# Patient Record
Sex: MALE | Race: WHITE
[De-identification: names, ages, dates, MRNs, and addresses within clinical notes are randomized per-mention and may not be internally consistent; named-entity substitution may affect disease eponyms.]

---

## 2017-09-17 ENCOUNTER — HOSPITAL ENCOUNTER (EMERGENCY)
Dept: HOSPITAL 80 - FED | Age: 37
Discharge: HOME | End: 2017-09-17
Payer: MEDICAID

## 2017-09-17 VITALS — TEMPERATURE: 98.2 F

## 2017-09-17 VITALS
HEART RATE: 78 BPM | DIASTOLIC BLOOD PRESSURE: 82 MMHG | RESPIRATION RATE: 18 BRPM | SYSTOLIC BLOOD PRESSURE: 134 MMHG | OXYGEN SATURATION: 94 %

## 2017-09-17 DIAGNOSIS — F17.200: ICD-10-CM

## 2017-09-17 DIAGNOSIS — R07.9: Primary | ICD-10-CM

## 2017-09-17 LAB
% IMMATURE GRANULYOCYTES: 0.1 % (ref 0–1.1)
ABSOLUTE IMMATURE GRANULOCYTES: 0.01 10^3/UL (ref 0–0.1)
ABSOLUTE NRBC COUNT: 0 10^3/UL (ref 0–0.01)
ADD DIFF?: NO
ADD MORPH?: NO
ADD SCAN?: NO
ANION GAP SERPL CALC-SCNC: 11 MEQ/L (ref 8–16)
ATYPICAL LYMPHOCYTE FLAG: 10 (ref 0–99)
CALCIUM SERPL-MCNC: 9.9 MG/DL (ref 8.5–10.4)
CHLORIDE SERPL-SCNC: 101 MEQ/L (ref 97–110)
CO2 SERPL-SCNC: 23 MEQ/L (ref 22–31)
CREAT SERPL-MCNC: 0.9 MG/DL (ref 0.7–1.3)
ERYTHROCYTE [DISTWIDTH] IN BLOOD BY AUTOMATED COUNT: 11.6 % (ref 11.5–15.2)
FRAGMENT RBC FLAG: 0 (ref 0–99)
GFR SERPL CREATININE-BSD FRML MDRD: > 60 ML/MIN/{1.73_M2}
GLUCOSE SERPL-MCNC: 112 MG/DL (ref 70–100)
HCT VFR BLD CALC: 43.2 % (ref 40–51)
HGB BLD-MCNC: 15.7 G/DL (ref 13.7–17.5)
LEFT SHIFT FLG: 0 (ref 0–99)
LIPEMIA HEMOLYSIS FLAG: 90 (ref 0–99)
MCH RBC BLDCO QN: 32 PG (ref 27.9–34.1)
MCHC RBC AUTO-ENTMCNC: 36.3 G/DL (ref 32.4–36.7)
MCV RBC AUTO: 88.2 FL (ref 81.5–99.8)
NRBC-AUTO%: 0 % (ref 0–0.2)
PLATELET # BLD: 219 10^3/UL (ref 150–400)
PLATELET CLUMPS FLAG: 0 (ref 0–99)
PMV BLD AUTO: 9.3 FL (ref 8.7–11.7)
POTASSIUM SERPL-SCNC: 4.4 MEQ/L (ref 3.5–5.2)
RBC # BLD AUTO: 4.9 10^6/UL (ref 4.4–6.38)
SODIUM SERPL-SCNC: 135 MEQ/L (ref 134–144)
TROPONIN I SERPL-MCNC: < 0.012 NG/ML (ref 0–0.03)

## 2017-09-17 NOTE — CPEKG
Heart Rate: 79

RR Interval: 759

P-R Interval: 168

QRSD Interval: 92

QT Interval: 380

QTC Interval: 436

P Axis: 69

QRS Axis: 83

T Wave Axis: 65

EKG Severity - BORDERLINE ECG -

EKG Impression: SINUS RHYTHM

EKG Impression: BORDERLINE T ABNORMALITIES, ANT-LAT LEADS

Electronically Signed By: Rosmery Eli 17-Sep-2017 15:50:33

## 2017-09-17 NOTE — EDPHY
H & P


Stated Complaint: Intermit L lat CP;sxs sporadic "for a while";worse x 1 day





- Personal History


Current Tetanus Diphtheria and Acellular Pertussis (TDAP): Yes





- Medical/Surgical History


Hx Asthma: No


Hx Chronic Respiratory Disease: No


Hx Diabetes: No


Hx Cardiac Disease: No


Hx Renal Disease: No


Hx Cirrhosis: No


Hx Alcoholism: No


Hx HIV/AIDS: No


Hx Splenectomy or Spleen Trauma: No


Other PMH: PTSD/bilateral inguinal hernia, ear sx, anxiety/CHOLY/APPY.  R thumb 

surg





- Social History


Smoking Status: Current every day smoker


Time Seen by Provider: 09/17/17 12:19


HPI/ROS: 





Chief complaint:  Chest pain





History of present illness:  This is a 37-year-old male who presents to the 

emergency department for chest pain.  Patient reports the onset of symptoms 

over the last day.  States pain is primarily on the left side.  He reports a 

soreness on the left side.  It occasionally radiates into the left arm.  He 

denies precipitating factors.  He denies alleviating factors.  He denies other 

associated signs or symptoms including no fevers, no cold symptoms, no cough or 

trouble breathing, no pain or swelling in the legs.  He has a long history of 

anxiety and has been on Xanax for a long time but has been slowly decreasing 

the dose and is wondering if anxiety could be contributing to his symptoms.





Review of systems:  A 10 point review of systems was obtained and other than 

described above was negative (Jonh Banerjee)





- Physical Exam


Exam: 





General Appearance: Alert, nontoxic.


Eyes: Pupils equal and round no pallor or injection.


ENT, Mouth: Mucous membranes moist.


Respiratory: There are no retractions, lungs are clear to auscultation.


Cardiovascular:  Regular rate and rhythm.


Gastrointestinal:  Abdomen is soft and non tender, no masses, bowel sounds 

normal.


Neurological:  Alert and oriented x4.  Strength and sensation intact and 

symmetrical.


Skin: Warm and dry, no rashes.


Musculoskeletal: Neck is supple non tender. Extremities are symmetrical, full 

range of motion.  No erythema, edema or asymmetry of the lower extremities.  No 

evidence of DVT.


Psychiatric: Patient is oriented X 3, there is no agitation.


 (John Banerjee)


Constitutional: 





 Initial Vital Signs











Temperature (C)  36.8 C   09/17/17 11:55


 


Heart Rate  90   09/17/17 11:55


 


Respiratory Rate  18   09/17/17 11:55


 


Blood Pressure  122/77 H  09/17/17 11:55


 


O2 Sat (%)  97   09/17/17 11:55








 











O2 Delivery Mode               Room Air














Allergies/Adverse Reactions: 


 





acetaminophen [From Vicodin] Allergy (Verified 09/17/17 11:59)


 


amoxicillin Allergy (Verified 09/17/17 11:59)


 


hydrocodone bitartrate [From Vicodin] Allergy (Verified 09/17/17 11:59)


 


Penicillins Allergy (Verified 09/17/17 11:59)


 


tramadol Allergy (Verified 09/17/17 11:59)


 








Home Medications: 














 Medication  Instructions  Recorded


 


ALPRAZolam [Xanax 0.25 MG (*)]  09/29/15














Medical Decision Making


ED Course/Re-evaluation: 





Patient is discussed with my secondary supervising physician Dr. Rosmery Eli.  

Patient presents to the emergency department for chest pain.  He is nontoxic.  

Afebrile and vital signs are stable.  Physical exam is unremarkable.  Lab 

studies, chest x-ray and EKG unremarkable.  My suspicion for serious underlying 

pathology is low.  I believe he is appropriate for outpatient follow-up for 

further evaluation and care.  He is referred to primary care.  Home care is 

discussed.  Return precautions are given.  Patient voiced understanding and 

agreement with plan. (John Banerjee)


Differential Diagnosis: 





Included but not limited to anxiety, musculoskeletal pain, reflux, GERD, 

cardiac dysrhythmia, PE, pulmonary infections, pneumothorax, ACS (John Banerjee)


Other Provider: 





The patient was evaluated and managed by the Physician Assistant/ Nurse 

Practitioner.  I discussed the patient's presentation and course with the 

midlevel provider with them and agree with the evaluation.  My co-signature 

indicates that I have reviewed this chart and I agree with the findings and 

plan of care as documented.  I am the secondary supervising physician. (Rosmery Eli)





- Data Points


Laboratory Results: 





 Laboratory Results





 09/17/17 12:40 





 09/17/17 12:40 











Departure





- Departure


Disposition: Home, Routine, Self-Care


Clinical Impression: 


 Chest pain





Condition: Good


Instructions:  Chest Pain (ED)


Additional Instructions: 


Follow-up with a primary care doctor for continued evaluation and care





If symptoms worsen or new symptoms develop return to the emergency room for 

recheck


Referrals: 


NONE *PRIMARY CARE P,. [Primary Care Provider] - As per Instructions


[facility] - As per Instructions

## 2018-09-03 ENCOUNTER — HOSPITAL ENCOUNTER (EMERGENCY)
Dept: HOSPITAL 80 - FED | Age: 38
Discharge: HOME | End: 2018-09-03
Payer: SELF-PAY

## 2018-09-03 VITALS — SYSTOLIC BLOOD PRESSURE: 129 MMHG | DIASTOLIC BLOOD PRESSURE: 82 MMHG

## 2018-09-03 DIAGNOSIS — R50.9: ICD-10-CM

## 2018-09-03 DIAGNOSIS — R07.0: Primary | ICD-10-CM

## 2018-09-03 DIAGNOSIS — R13.10: ICD-10-CM

## 2018-09-03 NOTE — EDPHY
HPI/HX/ROS/PE/MDM


Narrative: 





CHIEF COMPLAINT:  Sore throat, dysphagia, fever





HISTORY OF PRESENT ILLNESS:   





This patient is a 38 year old male complaining of throat pain and dysphagia. 

Two weeks ago, he developed pain in the area just to the left of his laryngeal 

prominence. His discomfort now radiates to the left. He complains of dysphagia 

and odynophagia. He denies swallowing any foreign body or choking on any food 

recently. The patient has felt febrile several times during the past two weeks 

and reports an intermittent mild fever of 99.9 degrees. He feels generally weak 

and fatigued. He has had difficulty completing his normal work activities which 

involve heavy lifting. He has tried a heating pad and ibuprofen for pain relief

, last dose 4 hours prior to arrival. The patient endorses some otalgia on the 

left. He denies rhinorrhea, cough, or other URI symptoms. He reports he was 

recently on Cipro for one month for a prostate problem. No chills, chest pain, 

shortness of breath, palpitations, vomiting, diarrhea, urinary complaints, 

headache, lightheadedness. 





REVIEW OF SYSTEMS:


A comprehensive 10 system review of systems is otherwise negative aside from 

elements mentioned in the history of present illness and medical decision 

making.





PAST MEDICAL HISTORY: PTSD, anxiety. Bilateral inguinal hernias. 

Cholecystectomy. Appendectomy. Hand surgery.





SOCIAL HISTORY: Current tobacco use. Employed. Lives in [place]. 








VITAL SIGNS: Reviewed by me


GENERAL: Well-developed, well-nourished, resting comfortably in no respiratory 

distress.


HEENT: Atraumatic. Eyes: No icterus, no injection. Mouth: moist mucous 

membranes.  No erythema or lesions. Neck:  Questionable mild swelling to the 

left anterior neck.  Shotty adenopathy on the left.


LUNGS: Clear to auscultation bilaterally, no wheezes, rhonchi or rales.


CARDIAC: Regular rate and rhythm, no rubs, murmurs or gallops.


ABDOMEN: Soft, nontender, nondistended, bowel sounds normal.


BACK:  No CVA tenderness.


EXTREMITIES: No trauma. No edema.  Range of motion is normal throughout.


NEURO: Alert and oriented,  grossly nonfocal.  


SKIN: Warm and dry, no rash.


PSYCHIATRIC: Normal mentation, no agitation.





Portions of this note were transcribed by a medical scribe.  I personally 

performed a history, physical exam, medical decision making, and confirmed 

accuracy of information the transcribed note.








ED Course: 





37 y/o male presents with throat pain and anterior neck pain. Throat is not 

erythematous on exam. Patient does have some swelling and tenderness to the 

anterior neck on palpation and mild left lymphadenopathy. Plan for x-ray or 

chest and neck for further evaluation. 





20:24 Reviewed x-rays. No acute findings in the throat or chest. 





Reassessed patient. Plan to discharge home in good condition with prescription 

for azithromycin, 500mg. The first dose will be administered here in the 

emergency department. He will follow up with otolaryngology for further 

evaluation. Return precautions discussed. He is comfortable with this plan. 


MDM: 





Differential diagnoses for the patient's symptom complex was considered 

including but not limited to adenitis, lymphadenopathy, upper respiratory 

infection, pharyngitis, thyroid mass, thyroid nodule, soft tissue mass of the 

neck.





- Data Points


Imaging Results: 





Chest X-Ray  09/03/18 19:57


Impression:  No acute findings in the chest.


 


 


Soft Tissue Neck X-Ray  09/03/18 19:57


Impression: No acute findings.


 


 


Imaging: I viewed and interpreted images myself


Medications Given: 


 








Discontinued Medications





Azithromycin (Zithromax)  500 mg PO EDNOW ONE


   PRN Reason: Protocol


   Stop: 09/03/18 20:03


   Last Admin: 09/03/18 20:15 Dose:  500 mg








General


Time Seen by Provider: 09/03/18 19:42


Initial Vital Signs: 


 Initial Vital Signs











Temperature (C)  36.7 C   09/03/18 19:35


 


Heart Rate  85   09/03/18 19:35


 


Respiratory Rate  18   09/03/18 19:35


 


Blood Pressure  129/82 H  09/03/18 19:35


 


O2 Sat (%)  97   09/03/18 19:35








 











O2 Delivery Mode               Room Air














Allergies/Adverse Reactions: 


 





acetaminophen [From Vicodin] Allergy (Verified 09/03/18 19:38)


 


amoxicillin Allergy (Verified 09/03/18 19:38)


 


hydrocodone bitartrate [From Vicodin] Allergy (Verified 09/03/18 19:38)


 


Penicillins Allergy (Verified 09/03/18 19:38)


 


tramadol Allergy (Verified 09/03/18 19:38)


 








Home Medications: 














 Medication  Instructions  Recorded


 


ALPRAZolam [Xanax 0.25 MG (*)]  09/29/15


 


Atenolol  09/03/18


 


Azithromycin [Zithromax] 250 mg PO DAILY #4 tab 09/03/18














Departure





- Departure


Disposition: Home, Routine, Self-Care


Clinical Impression: 


 Odynophagia, Neck pain





Condition: Good


Instructions:  Neck Pain (ED)


Additional Instructions: 


1. Take azithromycin as prescribed. 





2. Follow up with an ear, nose, and throat provider for further evaluation, 

within one week. 





3. Return to the emergency department for fever, chest pain, shortness of 

breath or difficulty breathing, inability to swallow, severe pain, or other 

worsening of condition. 


Referrals: 


Marin Foy MD [Medical Doctor] - As per Instructions


Prescriptions: 


Azithromycin [Zithromax] 250 mg PO DAILY #4 tab


Report Scribed for: Rosmery Eli


Report Scribed by: Bessie Vega


Date of Report: 09/03/18


Time of Report: 19:47